# Patient Record
Sex: MALE | Race: BLACK OR AFRICAN AMERICAN | URBAN - METROPOLITAN AREA
[De-identification: names, ages, dates, MRNs, and addresses within clinical notes are randomized per-mention and may not be internally consistent; named-entity substitution may affect disease eponyms.]

---

## 2018-07-23 ENCOUNTER — OFFICE VISIT (OUTPATIENT)
Dept: INTERNAL MEDICINE CLINIC | Facility: CLINIC | Age: 48
End: 2018-07-23

## 2018-07-23 VITALS
DIASTOLIC BLOOD PRESSURE: 84 MMHG | HEIGHT: 72 IN | TEMPERATURE: 97 F | BODY MASS INDEX: 30.07 KG/M2 | HEART RATE: 72 BPM | WEIGHT: 222 LBS | RESPIRATION RATE: 18 BRPM | SYSTOLIC BLOOD PRESSURE: 119 MMHG

## 2018-07-23 DIAGNOSIS — E66.9 OBESITY, CLASS I, BMI 30-34.9: ICD-10-CM

## 2018-07-23 DIAGNOSIS — H57.89 REDNESS OF RIGHT EYE: ICD-10-CM

## 2018-07-23 DIAGNOSIS — K21.9 GASTROESOPHAGEAL REFLUX DISEASE WITHOUT ESOPHAGITIS: ICD-10-CM

## 2018-07-23 DIAGNOSIS — S16.1XXA STRAIN OF NECK MUSCLE, INITIAL ENCOUNTER: ICD-10-CM

## 2018-07-23 DIAGNOSIS — E55.9 VITAMIN D DEFICIENCY: ICD-10-CM

## 2018-07-23 DIAGNOSIS — M54.2 NECK PAIN ON RIGHT SIDE: Primary | ICD-10-CM

## 2018-07-23 RX ORDER — LORATADINE 10 MG/1
10 TABLET ORAL
Qty: 30 TAB | Refills: 2 | Status: SHIPPED | OUTPATIENT
Start: 2018-07-23 | End: 2018-08-06

## 2018-07-23 RX ORDER — CYCLOBENZAPRINE HCL 10 MG
TABLET ORAL
COMMUNITY
End: 2018-08-06

## 2018-07-23 RX ORDER — PANTOPRAZOLE SODIUM 20 MG/1
20 TABLET, DELAYED RELEASE ORAL DAILY
COMMUNITY
End: 2019-06-10 | Stop reason: SDUPTHER

## 2018-07-23 RX ORDER — GLUCOSAMINE SULFATE 1500 MG
POWDER IN PACKET (EA) ORAL DAILY
COMMUNITY
End: 2018-08-06

## 2018-07-23 RX ORDER — NAPROXEN 500 MG/1
500 TABLET ORAL 2 TIMES DAILY WITH MEALS
Qty: 30 TAB | Refills: 0 | Status: SHIPPED | OUTPATIENT
Start: 2018-07-23 | End: 2018-08-06

## 2018-07-23 RX ORDER — IBUPROFEN 800 MG/1
TABLET ORAL
COMMUNITY
End: 2018-07-23 | Stop reason: ALTCHOICE

## 2018-07-23 RX ORDER — TERBINAFINE HYDROCHLORIDE 250 MG/1
250 TABLET ORAL DAILY
COMMUNITY
End: 2019-05-07

## 2018-07-23 NOTE — PATIENT INSTRUCTIONS
Neck Strain or Sprain: Rehab Exercises Your Care Instructions Here are some examples of typical rehabilitation exercises for your condition. Start each exercise slowly. Ease off the exercise if you start to have pain. Your doctor or physical therapist will tell you when you can start these exercises and which ones will work best for you. How to do the exercises Neck rotation 1. Sit in a firm chair, or stand up straight. 2. Keeping your chin level, turn your head to the right, and hold for 15 to 30 seconds. 3. Turn your head to the left and hold for 15 to 30 seconds. 4. Repeat 2 to 4 times to each side. Neck stretches 1. Look straight ahead, and tip your right ear to your right shoulder. Do not let your left shoulder rise up as you tip your head to the right. 2. Hold for 15 to 30 seconds. 3. Tilt your head to the left. Do not let your right shoulder rise up as you tip your head to the left. 4. Hold for 15 to 30 seconds. 5. Repeat 2 to 4 times to each side. Forward neck flexion 1. Sit in a firm chair, or stand up straight. 2. Bend your head forward. 3. Hold for 15 to 30 seconds. 4. Repeat 2 to 4 times. Lateral (side) bend strengthening 1. With your right hand, place your first two fingers on your right temple. 2. Start to bend your head to the side while using gentle pressure from your fingers to keep your head from bending. 3. Hold for about 6 seconds. 4. Repeat 8 to 12 times. 5. Switch hands and repeat the same exercise on your left side. Forward bend strengthening 1. Place your first two fingers of either hand on your forehead. 2. Start to bend your head forward while using gentle pressure from your fingers to keep your head from bending. 3. Hold for about 6 seconds. 4. Repeat 8 to 12 times. Neutral position strengthening 1. Using one hand, place your fingertips on the back of your head at the top of your neck.  
2. Start to bend your head backward while using gentle pressure from your fingers to keep your head from bending. 3. Hold for about 6 seconds. 4. Repeat 8 to 12 times. Chin tuck 1. Lie on the floor with a rolled-up towel under your neck. Your head should be touching the floor. 2. Slowly bring your chin toward your chest. 
3. Hold for a count of 6, and then relax for up to 10 seconds. 4. Repeat 8 to 12 times. Follow-up care is a key part of your treatment and safety. Be sure to make and go to all appointments, and call your doctor if you are having problems. It's also a good idea to know your test results and keep a list of the medicines you take. Where can you learn more? Go to http://lyle-brandon.info/. Enter M679 in the search box to learn more about \"Neck Strain or Sprain: Rehab Exercises. \" Current as of: November 29, 2017 Content Version: 11.7 © 8599-2995 [employer]. Care instructions adapted under license by [employer] (which disclaims liability or warranty for this information). If you have questions about a medical condition or this instruction, always ask your healthcare professional. [address] any warranty or liability for your use of this information.

## 2018-07-23 NOTE — MR AVS SNAPSHOT
[address] [place] [address] 
[phone] Patient: Brionna Earl MRN: [id number] ICK:[id number] Visit Information Date & Time Provider Department Dept. Phone Encounter #  
 7/23/2018 11:00 AM Jo Doll NP [facility] Internal Medicine [phone] [id number] Follow-up Instructions Return for Schedule your annual physical with fasting labs. Upcoming Health Maintenance Date Due DTaP/Tdap/Td series (1 - Tdap) 3/6/1991 Influenza Age 5 to Adult 8/1/2018 Allergies as of 7/23/2018  Review Complete On: 7/23/2018 By: Jo Doll NP No Known Allergies Current Immunizations  Never Reviewed No immunizations on file. Not reviewed this visit You Were Diagnosed With   
  
 Codes Comments Neck pain on right side    -  Primary ICD-10-CM: M54.2 ICD-9-CM: 723.1 Redness of right eye     ICD-10-CM: H57.8 ICD-9-CM: 379.93 Vitamin D deficiency     ICD-10-CM: E55.9 ICD-9-CM: 268.9 Strain of neck muscle, initial encounter     ICD-10-CM: S16. Ulysess Quill ICD-9-CM: 847.0 Gastroesophageal reflux disease without esophagitis     ICD-10-CM: K21.9 ICD-9-CM: 530.81 Vitals BP Pulse Temp Resp Height(growth percentile) Weight(growth percentile) 119/84 72 97 °F (36.1 °C) (Oral) 18 6' (1.829 m) 222 lb (100.7 kg) BMI Smoking Status 30.11 kg/m2 Current Some Day Smoker Vitals History BMI and BSA Data Body Mass Index Body Surface Area  
 30.11 kg/m 2 2.26 m 2 Preferred Pharmacy Pharmacy Name Phone [facility] [address] [facility] [phone] Your Updated Medication List  
  
   
This list is accurate as of 7/23/18 11:49 AM.  Always use your most recent med list.  
  
  
  
  
 cyclobenzaprine 10 mg tablet Commonly known as:  FLEXERIL  
 Take  by mouth three (3) times daily as needed for Muscle Spasm(s). loratadine 10 mg tablet Commonly known as:  Brenda Nay Take 1 Tab by mouth nightly. naproxen 500 mg tablet Commonly known as:  NAPROSYN Take 1 Tab by mouth two (2) times daily (with meals). pantoprazole 20 mg tablet Commonly known as:  PROTONIX Take 20 mg by mouth daily. terbinafine HCl 250 mg tablet Commonly known as:  LAMISIL Take 250 mg by mouth daily. VITAMIN D3 1,000 unit Cap Generic drug:  cholecalciferol Take  by mouth daily. Prescriptions Sent to Pharmacy Refills  
 loratadine (CLARITIN) 10 mg tablet 2 Sig: Take 1 Tab by mouth nightly. Class: Normal  
 Pharmacy: [address] Ph #: [phone] Route: Oral  
 naproxen (NAPROSYN) 500 mg tablet 0 Sig: Take 1 Tab by mouth two (2) times daily (with meals). Class: Normal  
 Pharmacy: [address] Ph #: [phone] Route: Oral  
  
We Performed the Following REFERRAL TO SPORTS MEDICINE [EKS017 Custom] Follow-up Instructions Return for Schedule your annual physical with fasting labs. Referral Information Referral ID Referred By Referred To  
  
 [id number] SKIFF, Venancio Chute, MD   
   [address] [address] [address] Phone: [phone] Fax: [phone] Visits Status Start Date End Date 1 New Request 7/23/18 7/23/19 If your referral has a status of pending review or denied, additional information will be sent to support the outcome of this decision. Patient Instructions Neck Strain or Sprain: Rehab Exercises Your Care Instructions Here are some examples of typical rehabilitation exercises for your condition. Start each exercise slowly.  Ease off the exercise if you start to have pain. Your doctor or physical therapist will tell you when you can start these exercises and which ones will work best for you. How to do the exercises Neck rotation 1. Sit in a firm chair, or stand up straight. 2. Keeping your chin level, turn your head to the right, and hold for 15 to 30 seconds. 3. Turn your head to the left and hold for 15 to 30 seconds. 4. Repeat 2 to 4 times to each side. Neck stretches 1. Look straight ahead, and tip your right ear to your right shoulder. Do not let your left shoulder rise up as you tip your head to the right. 2. Hold for 15 to 30 seconds. 3. Tilt your head to the left. Do not let your right shoulder rise up as you tip your head to the left. 4. Hold for 15 to 30 seconds. 5. Repeat 2 to 4 times to each side. Forward neck flexion 1. Sit in a firm chair, or stand up straight. 2. Bend your head forward. 3. Hold for 15 to 30 seconds. 4. Repeat 2 to 4 times. Lateral (side) bend strengthening 1. With your right hand, place your first two fingers on your right temple. 2. Start to bend your head to the side while using gentle pressure from your fingers to keep your head from bending. 3. Hold for about 6 seconds. 4. Repeat 8 to 12 times. 5. Switch hands and repeat the same exercise on your left side. Forward bend strengthening 1. Place your first two fingers of either hand on your forehead. 2. Start to bend your head forward while using gentle pressure from your fingers to keep your head from bending. 3. Hold for about 6 seconds. 4. Repeat 8 to 12 times. Neutral position strengthening 1. Using one hand, place your fingertips on the back of your head at the top of your neck. 2. Start to bend your head backward while using gentle pressure from your fingers to keep your head from bending. 3. Hold for about 6 seconds. 4. Repeat 8 to 12 times. Chin tuck 1. Lie on the floor with a rolled-up towel under your neck.  Your head should be touching the floor. 2. Slowly bring your chin toward your chest. 
3. Hold for a count of 6, and then relax for up to 10 seconds. 4. Repeat 8 to 12 times. Follow-up care is a key part of your treatment and safety. Be sure to make and go to all appointments, and call your doctor if you are having problems. It's also a good idea to know your test results and keep a list of the medicines you take. Where can you learn more? Go to http://lyle-brandon.info/. Enter M679 in the search box to learn more about \"Neck Strain or Sprain: Rehab Exercises. \" Current as of: November 29, 2017 Content Version: 11.7 © 5671-5033 [facility] [employer]. Care instructions adapted under license by [employer] (which disclaims liability or warranty for this information). If you have questions about a medical condition or this instruction, always ask your healthcare professional. [address] any warranty or liability for your use of this information. Introducing [facility]! Sushant Mesa introduces [employer] patient portal. Now you can access parts of your medical record, email your doctor's office, and request medication refills online. 1. In your internet browser, go to [email or web address] 2. Click on the First Time User? Click Here link in the Sign In box. You will see the New Member Sign Up page. 3. Enter your [employer] Access Code exactly as it appears below. You will not need to use this code after youve completed the sign-up process. If you do not sign up before the expiration date, you must request a new code. · [employer] Access Code: [email or web address] Expires: 10/21/2018 11:11 AM 
 
4. Enter the last four digits of your Social Security Number (xxxx) and Date of Birth (mm/dd/yyyy) as indicated and click Submit. You will be taken to the next sign-up page. 5. Create a [employer] ID.  This will be your [employer] login ID and cannot be changed, so think of one that is secure and easy to remember. 6. Create a [employer] password. You can change your password at any time. 7. Enter your Password Reset Question and Answer. This can be used at a later time if you forget your password. 8. Enter your e-mail address. You will receive e-mail notification when new information is available in [address] 9. Click Sign Up. You can now view and download portions of your medical record. 10. Click the Download Summary menu link to download a portable copy of your medical information. If you have questions, please visit the Frequently Asked Questions section of the [employer] website. Remember, [employer] is NOT to be used for urgent needs. For medical emergencies, dial 911. Now available from your iPhone and Android! Please provide this summary of care documentation to your next provider. Your primary care clinician is listed as Sari Bauer. If you have any questions after today's visit, please call [phone].

## 2018-07-23 NOTE — PROGRESS NOTES
Subjective:      Trinity Plascencia is a 50 y.o. male who is a new patient and is here to establish care. Previous followed by PCP Cliff. The following sections were reviewed & updated as appropriate: PMH, PL, PSH, FH, RxH, and SH. Right eye: intermittent eye redness that started a few months ago. He denies itching, swelling, purulent drainage, pain. He wears reading glasses. When he is using reading glasses his eye will be runny. Overall vision is worsening but he denies any acute change during the redness, he denies floaters, curtain or shadows in his vision. He states he will wake up and it will be red, he will be at work and gets redness, symptoms are not consistent. 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 He noted warm sensation with pins and needles to his right upper back/neck. Pain rated 3-4/10, no pain right now. He has taken flexeril that did not help symptoms. He will only feel pain with certain movements. Symptoms started a couple of months ago. He noted one week when symptoms were gone. Patient Active Problem List    Diagnosis Date Noted    Vitamin D deficiency 07/23/2018     Current Outpatient Prescriptions   Medication Sig Dispense Refill    cholecalciferol (VITAMIN D3) 1,000 unit cap Take  by mouth daily.  ibuprofen (MOTRIN) 800 mg tablet Take  by mouth.  terbinafine HCl (LAMISIL) 250 mg tablet Take 250 mg by mouth daily.  cyclobenzaprine (FLEXERIL) 10 mg tablet Take  by mouth three (3) times daily as needed for Muscle Spasm(s).  pantoprazole (PROTONIX) 20 mg tablet Take 20 mg by mouth daily. Not on File  Past Medical History:   Diagnosis Date    Acid reflux         Review of Systems    A comprehensive review of systems was negative except for that written in the HPI.      Objective:     Visit Vitals    /84    Pulse 72    Temp 97 °F (36.1 °C) (Oral)    Resp 18    Ht 6' (1.829 m)    Wt 222 lb (100.7 kg)    BMI 30.11 kg/m2     General appearance: alert, cooperative, no distress, appears stated age  Head: Normocephalic, without obvious abnormality, atraumatic  Eyes: negative  Ears: normal TM's and external ear canals AU  Nose: Nares normal. Septum midline. Mucosa normal. No drainage or sinus tenderness. Throat: Lips, mucosa, and tongue normal. Teeth and gums normal  Neck: supple, symmetrical, trachea midline, no carotid bruit, no JVD. Trapezius tight on right, FROM. Right shoulder mobility normal.   Back: symmetric, no curvature. ROM normal. No CVA tenderness. Lungs: clear to auscultation bilaterally  Heart: regular rate and rhythm, S1, S2 normal, no murmur, click, rub or gallop  Extremities: extremities normal, atraumatic, no cyanosis or edema. Equal strength noted to upper arms  Pulses: 2+ and symmetric  Neurologic: Alert and oriented X 3, normal strength and tone. Normal symmetric reflexes. Normal coordination and gait  Psych: appropriate mood, speech, affect  Nursing note and vitals reviewed  Assessment/Plan:       ICD-10-CM ICD-9-CM    1. Neck pain on right side M54.2 723.1 REFERRAL TO SPORTS MEDICINE      naproxen (NAPROSYN) 500 mg tablet   2. Redness of right eye H57.8 379.93 loratadine (CLARITIN) 10 mg tablet   3. Vitamin D deficiency E55.9 268.9    4. Strain of neck muscle, initial encounter S16. 1XXA 847.0 REFERRAL TO SPORTS MEDICINE      naproxen (NAPROSYN) 500 mg tablet   5. Gastroesophageal reflux disease without esophagitis K21.9 530.81    6. Obesity, Class I, BMI 30-34.9 E66.9 278.00    He will follow up with Dr. Tom Carbone, advised heat and stretches, naproxen as needed. He will also start claritin to see if that improves eye redness, if not he will call office. Follow-up Disposition:  Return for Schedule your annual physical with fasting labs.          Advised him to call back or return to office if symptoms worsen/change/persist.  Discussed expected course/resolution/complications of diagnosis in detail with patient. Medication risks/benefits/costs/interactions/alternatives discussed with patient. He was given an after visit summary which includes diagnoses, current medications, & vitals. He expressed understanding with the diagnosis and plan.

## 2018-08-06 ENCOUNTER — OFFICE VISIT (OUTPATIENT)
Dept: INTERNAL MEDICINE CLINIC | Facility: CLINIC | Age: 48
End: 2018-08-06

## 2018-08-06 VITALS
HEIGHT: 72 IN | DIASTOLIC BLOOD PRESSURE: 70 MMHG | SYSTOLIC BLOOD PRESSURE: 115 MMHG | TEMPERATURE: 97.3 F | RESPIRATION RATE: 18 BRPM | BODY MASS INDEX: 29.53 KG/M2 | WEIGHT: 218 LBS | HEART RATE: 72 BPM

## 2018-08-06 DIAGNOSIS — Z11.3 SCREEN FOR STD (SEXUALLY TRANSMITTED DISEASE): ICD-10-CM

## 2018-08-06 DIAGNOSIS — Z12.11 COLON CANCER SCREENING: ICD-10-CM

## 2018-08-06 DIAGNOSIS — Z23 ENCOUNTER FOR IMMUNIZATION: ICD-10-CM

## 2018-08-06 DIAGNOSIS — E66.3 OVERWEIGHT (BMI 25.0-29.9): ICD-10-CM

## 2018-08-06 DIAGNOSIS — Z00.00 ENCOUNTER FOR GENERAL ADULT MEDICAL EXAMINATION W/O ABNORMAL FINDINGS: Primary | ICD-10-CM

## 2018-08-06 PROBLEM — E66.9 OBESITY, CLASS I, BMI 30-34.9: Status: RESOLVED | Noted: 2018-07-23 | Resolved: 2018-08-06

## 2018-08-06 NOTE — PROGRESS NOTES
Ternece Bradshaw  is a 50 y.o.  male  who present for TDAP immunizations/injections. He/she denies any symptoms , reactions or allergies that would exclude them from being immunized today. Risks and adverse reactions were discussed and the VIS was given if applicable to them. All questions were addressed. He/She was observed for 5 min post injection. There were no reactions observed.     Hugh Lamb, MICHELLE

## 2018-08-06 NOTE — MR AVS SNAPSHOT
[facility] 
 
 
 [facility] 
[phone] Patient: Keaton Witt MRN: [id number] Visit Information Date & Time Provider Department Dept. Phone Encounter #  
 8/6/2018  9:45 AM Rajesh Reyes [address] Internal Medicine [phone] [id number] Follow-up Instructions Return for As needed. Upcoming Health Maintenance Date Due Pneumococcal 19-64 Medium Risk (1 of 1 - PPSV23) 3/6/1989 DTaP/Tdap/Td series (1 - Tdap) 3/6/1991 Influenza Age 5 to Adult 8/1/2018 Allergies as of 8/6/2018  Review Complete On: 8/6/2018 By: Frantz Ramirez NP No Known Allergies Current Immunizations  Never Reviewed Name Date Tdap  Incomplete Not reviewed this visit You Were Diagnosed With   
  
 Codes Comments Encounter for general adult medical examination w/o abnormal findings    -  Primary ICD-10-CM: Z00.00 ICD-9-CM: V70.9 Overweight (BMI 25.0-29. 9)     ICD-10-CM: H75.5 ICD-9-CM: 278.02 Colon cancer screening     ICD-10-CM: Z12.11 ICD-9-CM: V76.51 Screen for STD (sexually transmitted disease)     ICD-10-CM: Z11.3 ICD-9-CM: V74.5 Encounter for immunization     ICD-10-CM: W24 ICD-9-CM: V03.89 Vitals BP Pulse Temp Resp Height(growth percentile) Weight(growth percentile) 115/70 72 97.3 °F (36.3 °C) (Oral) 18 6' (1.829 m) 218 lb (98.9 kg) BMI Smoking Status 29.57 kg/m2 Current Some Day Smoker Vitals History BMI and BSA Data Body Mass Index Body Surface Area  
 29.57 kg/m 2 2.24 m 2 Preferred Pharmacy Pharm[facility] Name Phone [place] [id number] [phone] Your Updated Medication List  
  
   
This list is accurate as of 8/6/18 10:17 AM.  Always use your most recent med list.  
  
  
  
  
 cyclobenzaprine 10 mg tablet Commonly known as:  FLEXERIL Take  by mouth three (3) times daily as needed for Muscle Spasm(s). loratadine 10 mg tablet Commonly known as:  Brenda Nay Take 1 Tab by mouth nightly. naproxen 500 mg tablet Commonly known as:  NAPROSYN Take 1 Tab by mouth two (2) times daily (with meals). pantoprazole 20 mg tablet Commonly known as:  PROTONIX Take 20 mg by mouth daily. terbinafine HCl 250 mg tablet Commonly known as:  LAMISIL Take 250 mg by mouth daily. VITAMIN D3 1,000 unit Cap Generic drug:  cholecalciferol Take  by mouth daily. We Performed the Following CBC WITH AUTOMATED DIFF [19779 CPT(R)] CT/NG/T.VAGINALIS AMPLIFICATION [id number] CPT(R)] HIV 1/2 AG/AB, 4TH GENERATION,W RFLX CONFIRM Q1933122 CPT(R)] LIPID PANEL [89032 CPT(R)] METABOLIC PANEL, COMPREHENSIVE [39077 CPT(R)] REFERRAL TO GASTROENTEROLOGY [XYW85 Custom] RPR W/REFLEX TITER AND TREPONEMA ABS [[id number] Custom] TETANUS, DIPHTHERIA TOXOIDS AND ACELLULAR PERTUSSIS VACCINE (TDAP), IN INDIVIDS. >=7, IM [id number] CPT(R)] Follow-up Instructions Return for As needed. Referral Information Referral ID Referred By Referred To  
  
 [id number] [facility], [address] [place] [address] [place] [address] 50 [facility] [address], [address] [place] [address] Visits Status Start Date End Date 1 New Request 8/6/18 8/6/19 If your referral has a status of pending review or denied, additional information will be sent to support the outcome of this decision. Patient Instructions Learning About Colonoscopy What is a colonoscopy? A colonoscopy is a test (also called a procedure) that lets a doctor look inside your large intestine. The doctor uses a thin, lighted tube called a colonoscope. The doctor uses it to look for small growths called polyps, colon or rectal cancer (colorectal cancer), or other problems like bleeding. During the procedure, the doctor can take samples of tissue. The samples can then be checked for cancer or other conditions. The doctor can also take out polyps. How is colonoscopy done? This procedure is done in a doctor's office or a clinic or hospital. You will get medicine to help you relax and not feel pain. Some people find that they do not remember having the test because of the medicine. The doctor gently moves the colonoscope, or scope, through the colon. The scope is also a small video camera. It lets the doctor see the colon and take pictures. A colonoscopy usually takes 30 to 45 minutes. It may take longer if the doctor has to remove polyps. How do you prepare for the procedure? You need to clean out your colon before the procedure so the doctor can see all of your colon. You may start the cleaning process a day or two before the test. This depends on which \"colon prep\" your doctor recommends. To clean your colon, you stop eating solid foods and drink only clear liquids. You can have water, tea, coffee, clear juices, clear broths, flavored ice pops, and gelatin (such as Jell-O). Do not drink anything red or purple, such as grape juice or fruit punch. And do not eat red or purple foods, such as grape ice pops or cherry gelatin. The day or night before the procedure, you drink a large amount of a special liquid. This causes loose, frequent stools. You will go to the bathroom a lot. It is very important to drink all of the colon prep liquid. If you have problems drinking the liquid, call your doctor. For many people, the prep is worse than the test. It may be uncomfortable, and you may feel hungry on the clear liquid diet. Some people do not go to work or do their usual activities on the day of the prep. Arrange to have someone take you home after the test. 
What can you expect after a colonoscopy? The nurses will watch you for 1 to 2 hours until the medicines wear off. Then you can go home. You will need a ride. Your doctor will tell you when you can eat and do your usual activities. Your doctor will talk to you about when you will need your next colonoscopy. The results of your test and your risk for colorectal cancer will help your doctor decide how often you need to be checked. Follow-up care is a key part of your treatment and safety. Be sure to make and go to all appointments, and call your doctor if you are having problems. It's also a good idea to know your test results and keep a list of the medicines you take. Where can you learn more? Go to http://lyle-brandon.info/. Enter D953 in the search box to learn more about \"Learning About Colonoscopy. \" Current as of: May 12, 2017 Content Version: 11.7 © 2277-1239 [employer], Incorporated. Care instructions adapted under license by [employer] (which disclaims liability or warranty for this information). If you have questions about a medical condition or this instruction, always ask your healthcare professional. [place] [address] any warranty or liability for your use of this information. Introducing [facility] & HEALTH SERVICES! [facility] introduces [email or web address] patient portal. Now you can access parts of your medical record, email your doctor's office, and request medication refills online. 1. In your internet browser, go to [email or web address] 2. Click on the First Time User? Click Here link in the Sign In box. You will see the New Member Sign Up page. 3. Enter your [email or web address] Access Code exactly as it appears below. You will not need to use this code after youve completed the sign-up process. If you do not sign up before the expiration date, you must request a new code. · [email or web address] Access Code: [email or web address] Expires: 10/21/2018 11:11 AM 
 
4.  Enter the last four digits of your Social Security Number (xxxx) and Date of Birth (mm/dd/yyyy) as indicated and click Submit. You will be taken to the next sign-up page. 5. Create a PeerJ ID. This will be your PeerJ login ID and cannot be changed, so think of one that is secure and easy to remember. 6. Create a PeerJ password. You can change your password at any time. 7. Enter your Password Reset Question and Answer. This can be used at a later time if you forget your password. 8. Enter your e-mail address. You will receive e-mail notification when new information is available in [address] 9. Click Sign Up. You can now view and download portions of your medical record. 10. Click the Download Summary menu link to download a portable copy of your medical information. If you have questions, please visit the Frequently Asked Questions section of the PeerJ website. Remember, PeerJ is NOT to be used for urgent needs. For medical emergencies, dial 911. Now available from your iPhone and Android! Please provide this summary of care documentation to your next provider. Your primary care clinician is listed as Digna Valle. If you have any questions after today's visit, please call [phone].

## 2018-08-06 NOTE — PROGRESS NOTES
Subjective:      Jef Koenig is a 50 y.o. male who presents today for CPE. He states his eye symptoms and neck pain have resolved. Health Maintenance  Immunizations:     Influenza: he will plan on getting it this fall. Tetanus: not UTD- will do today. Shingles: patient declined. Pneumonia: n/a. Cancer screening:     Prostate: deferred today, reviewed guidelines. Colon: guidelines reviewed, referral placed for colonoscopy. Body mass index is 29.57 kg/(m^2). 4lbs weight loss noted! Efforts encouraged! Wt Readings from Last 3 Encounters:   08/06/18 218 lb (98.9 kg)   07/23/18 222 lb (100.7 kg)     Patient Care Team:  Julieth Monreal NP as PCP - General (Nurse Practitioner)       The following sections were reviewed & updated as appropriate: PMH, PSH, FH, and SH. Patient Active Problem List   Diagnosis Code    Vitamin D deficiency E55.9    Gastroesophageal reflux disease without esophagitis K21.9    Neck pain on right side M54.2    Obesity, Class I, BMI 30-34.9 E66.9          Prior to Admission medications    Medication Sig Start Date End Date Taking? Authorizing Provider   cholecalciferol (VITAMIN D3) 1,000 unit cap Take  by mouth daily. Historical Provider   terbinafine HCl (LAMISIL) 250 mg tablet Take 250 mg by mouth daily. Historical Provider   cyclobenzaprine (FLEXERIL) 10 mg tablet Take  by mouth three (3) times daily as needed for Muscle Spasm(s). Historical Provider   pantoprazole (PROTONIX) 20 mg tablet Take 20 mg by mouth daily. Historical Provider   loratadine (CLARITIN) 10 mg tablet Take 1 Tab by mouth nightly. 7/23/18   Julieth Monreal NP   naproxen (NAPROSYN) 500 mg tablet Take 1 Tab by mouth two (2) times daily (with meals). 7/23/18   Julieth Monreal NP          No Known Allergies       Past Medical History:   Diagnosis Date    Acid reflux      No past surgical history on file.   Family History   Problem Relation Age of Onset    Diabetes Mother     No Known Problems Father     Diabetes Brother     Diabetes Maternal Grandmother     Hypertension Maternal Grandmother      Social History   Substance Use Topics    Smoking status: Current Some Day Smoker    Smokeless tobacco: Never Used    Alcohol use No        Review of Systems    A comprehensive review of systems was negative except for that written in the HPI. Objective:     Visit Vitals    /70    Pulse 72    Temp 97.3 °F (36.3 °C) (Oral)    Resp 18    Ht 6' (1.829 m)    Wt 218 lb (98.9 kg)    BMI 29.57 kg/m2     General:  Alert, cooperative, no distress, appears stated age. Head:  Normocephalic, without obvious abnormality, atraumatic. Eyes:  Conjunctivae/corneas clear. PERRL, EOMs intact. Fundi benign   Ears:  Normal TMs and external ear canals both ears. Nose: Nares normal. Septum midline. Mucosa normal. No drainage or sinus tenderness. Throat: Lips, mucosa, and tongue normal. Teeth and gums normal.   Neck: Supple, symmetrical, trachea midline, no adenopathy, thyroid: no enlargement/tenderness/nodules, no carotid bruit and no JVD. Back:   Symmetric, no curvature. ROM normal. No CVA tenderness. Lungs:   Clear to auscultation bilaterally. Chest wall:  No tenderness or deformity. Heart:  Regular rate and rhythm, S1, S2 normal, no murmur, click, rub or gallop. Abdomen:   Soft, non-tender. Bowel sounds normal. No masses,  No organomegaly. Extremities: Extremities normal, atraumatic, no cyanosis or edema. Pulses: 2+ and symmetric all extremities. Skin: Skin color, texture, turgor normal. No rashes or lesions   Lymph nodes: Cervical, supraclavicular, and axillary nodes normal.   Neurologic: CNII-XII intact. Normal strength, sensation and reflexes throughout. Nursing note and vitals reviewed  Assessment/Plan:       ICD-10-CM ICD-9-CM    1.  Encounter for general adult medical examination w/o abnormal findings Z00.00 V70.9 LIPID PANEL      CBC WITH AUTOMATED DIFF METABOLIC PANEL, COMPREHENSIVE   2. Overweight (BMI 25.0-29. 9) E66.3 278.02    3. Colon cancer screening Z12.11 V76.51 REFERRAL TO GASTROENTEROLOGY   4. Screen for STD (sexually transmitted disease) Z11.3 V74.5 HIV 1/2 AG/AB, 4TH GENERATION,W RFLX CONFIRM      RPR W/REFLEX TITER AND TREPONEMA ABS      CT/NG/T.VAGINALIS AMPLIFICATION   5. Encounter for immunization Z23 V03.89 TETANUS, DIPHTHERIA TOXOIDS AND ACELLULAR PERTUSSIS VACCINE (TDAP), IN INDIVIDS. >=7, IM     Follow-up Disposition:  Return for As needed. Advised him to call back or return to office if symptoms worsen/change/persist.  Discussed expected course/resolution/complications of diagnosis in detail with patient. Medication risks/benefits/costs/interactions/alternatives discussed with patient. He was given an after visit summary which includes diagnoses, current medications, & vitals. He expressed understanding with the diagnosis and plan.

## 2018-08-06 NOTE — PROGRESS NOTES
Chief Complaint   Patient presents with    Physical     1. Have you been to the ER, urgent care clinic since your last visit? Hospitalized since your last visit? No    2. Have you seen or consulted any other health care providers outside of the [address] [place] [address] since your last visit? Include any pap smears or colon screening.  No

## 2018-08-06 NOTE — PATIENT INSTRUCTIONS
Learning About Colonoscopy  What is a colonoscopy? A colonoscopy is a test (also called a procedure) that lets a doctor look inside your large intestine. The doctor uses a thin, lighted tube called a colonoscope. The doctor uses it to look for small growths called polyps, colon or rectal cancer (colorectal cancer), or other problems like bleeding. During the procedure, the doctor can take samples of tissue. The samples can then be checked for cancer or other conditions. The doctor can also take out polyps. How is colonoscopy done? This procedure is done in a doctor's office or a clinic or hospital. You will get medicine to help you relax and not feel pain. Some people find that they do not remember having the test because of the medicine. The doctor gently moves the colonoscope, or scope, through the colon. The scope is also a small video camera. It lets the doctor see the colon and take pictures. A colonoscopy usually takes 30 to 45 minutes. It may take longer if the doctor has to remove polyps. How do you prepare for the procedure? You need to clean out your colon before the procedure so the doctor can see all of your colon. You may start the cleaning process a day or two before the test. This depends on which \"colon prep\" your doctor recommends. To clean your colon, you stop eating solid foods and drink only clear liquids. You can have water, tea, coffee, clear juices, clear broths, flavored ice pops, and gelatin (such as Jell-O). Do not drink anything red or purple, such as grape juice or fruit punch. And do not eat red or purple foods, such as grape ice pops or cherry gelatin. The day or night before the procedure, you drink a large amount of a special liquid. This causes loose, frequent stools. You will go to the bathroom a lot. It is very important to drink all of the colon prep liquid. If you have problems drinking the liquid, call your doctor.   For many people, the prep is worse than the test. It may be uncomfortable, and you may feel hungry on the clear liquid diet. Some people do not go to work or do their usual activities on the day of the prep. Arrange to have someone take you home after the test.  What can you expect after a colonoscopy? The nurses will watch you for 1 to 2 hours until the medicines wear off. Then you can go home. You will need a ride. Your doctor will tell you when you can eat and do your usual activities. Your doctor will talk to you about when you will need your next colonoscopy. The results of your test and your risk for colorectal cancer will help your doctor decide how often you need to be checked. Follow-up care is a key part of your treatment and safety. Be sure to make and go to all appointments, and call your doctor if you are having problems. It's also a good idea to know your test results and keep a list of the medicines you take. Where can you learn more? Go to http://lyle-brandon.info/. Enter R290 in the search box to learn more about \"Learning About Colonoscopy. \"  Current as of: May 12, 2017  Content Version: 11.7  © 6478-4395 Zakada, Incorporated. Care instructions adapted under license by [employer] (which disclaims liability or warranty for this information). If you have questions about a medical condition or this instruction, always ask your healthcare professional. [address] any warranty or liability for your use of this information.

## 2018-08-08 LAB
ALBUMIN SERPL-MCNC: 4 G/DL (ref 3.5–5.5)
ALBUMIN/GLOB SERPL: 1.5 {RATIO} (ref 1.2–2.2)
ALP SERPL-CCNC: 68 IU/L (ref 39–117)
ALT SERPL-CCNC: 13 IU/L (ref 0–44)
AST SERPL-CCNC: 13 IU/L (ref 0–40)
BASOPHILS # BLD AUTO: 0 X10E3/UL (ref 0–0.2)
BASOPHILS NFR BLD AUTO: 0 %
BILIRUB SERPL-MCNC: 0.5 MG/DL (ref 0–1.2)
BUN SERPL-MCNC: 7 MG/DL (ref 6–24)
BUN/CREAT SERPL: 7 (ref 9–20)
C TRACH RRNA SPEC QL NAA+PROBE: NEGATIVE
CALCIUM SERPL-MCNC: 8.7 MG/DL (ref 8.7–10.2)
CHLORIDE SERPL-SCNC: 107 MMOL/L (ref 96–106)
CHOLEST SERPL-MCNC: 135 MG/DL (ref 100–199)
CO2 SERPL-SCNC: 25 MMOL/L (ref 20–29)
CREAT SERPL-MCNC: 0.95 MG/DL (ref 0.76–1.27)
EOSINOPHIL # BLD AUTO: 0.3 X10E3/UL (ref 0–0.4)
EOSINOPHIL NFR BLD AUTO: 5 %
ERYTHROCYTE [DISTWIDTH] IN BLOOD BY AUTOMATED COUNT: 13.8 % (ref 12.3–15.4)
GLOBULIN SER CALC-MCNC: 2.6 G/DL (ref 1.5–4.5)
GLUCOSE SERPL-MCNC: 89 MG/DL (ref 65–99)
HCT VFR BLD AUTO: 42.9 % (ref 37.5–51)
HDLC SERPL-MCNC: 51 MG/DL
HGB BLD-MCNC: 14 G/DL (ref 13–17.7)
HIV 1+2 AB+HIV1 P24 AG SERPL QL IA: NON REACTIVE
IMM GRANULOCYTES # BLD: 0 X10E3/UL (ref 0–0.1)
IMM GRANULOCYTES NFR BLD: 0 %
LDLC SERPL CALC-MCNC: 71 MG/DL (ref 0–99)
LYMPHOCYTES # BLD AUTO: 1.9 X10E3/UL (ref 0.7–3.1)
LYMPHOCYTES NFR BLD AUTO: 40 %
MCH RBC QN AUTO: 31.1 PG (ref 26.6–33)
MCHC RBC AUTO-ENTMCNC: 32.6 G/DL (ref 31.5–35.7)
MCV RBC AUTO: 95 FL (ref 79–97)
MONOCYTES # BLD AUTO: 0.4 X10E3/UL (ref 0.1–0.9)
MONOCYTES NFR BLD AUTO: 9 %
N GONORRHOEA RRNA SPEC QL NAA+PROBE: NEGATIVE
NEUTROPHILS # BLD AUTO: 2.1 X10E3/UL (ref 1.4–7)
NEUTROPHILS NFR BLD AUTO: 46 %
PLATELET # BLD AUTO: 250 X10E3/UL (ref 150–379)
POTASSIUM SERPL-SCNC: 4.1 MMOL/L (ref 3.5–5.2)
PROT SERPL-MCNC: 6.6 G/DL (ref 6–8.5)
RBC # BLD AUTO: 4.5 X10E6/UL (ref 4.14–5.8)
RPR SER QL: NON REACTIVE
SODIUM SERPL-SCNC: 144 MMOL/L (ref 134–144)
T VAGINALIS RRNA SPEC QL NAA+PROBE: NEGATIVE
TRIGL SERPL-MCNC: 67 MG/DL (ref 0–149)
VLDLC SERPL CALC-MCNC: 13 MG/DL (ref 5–40)
WBC # BLD AUTO: 4.7 X10E3/UL (ref 3.4–10.8)

## 2018-11-13 ENCOUNTER — OFFICE VISIT (OUTPATIENT)
Dept: INTERNAL MEDICINE CLINIC | Facility: CLINIC | Age: 48
End: 2018-11-13

## 2018-11-13 VITALS
TEMPERATURE: 98.7 F | BODY MASS INDEX: 29.26 KG/M2 | DIASTOLIC BLOOD PRESSURE: 88 MMHG | WEIGHT: 216 LBS | SYSTOLIC BLOOD PRESSURE: 122 MMHG | HEART RATE: 71 BPM | HEIGHT: 72 IN | RESPIRATION RATE: 16 BRPM

## 2018-11-13 DIAGNOSIS — M54.2 NECK PAIN: Primary | ICD-10-CM

## 2018-11-13 RX ORDER — NAPROXEN 500 MG/1
500 TABLET ORAL 2 TIMES DAILY WITH MEALS
Qty: 30 TAB | Refills: 0 | Status: SHIPPED | OUTPATIENT
Start: 2018-11-13 | End: 2019-05-07

## 2018-11-13 RX ORDER — HYDROCODONE BITARTRATE AND ACETAMINOPHEN 5; 325 MG/1; MG/1
TABLET ORAL
COMMUNITY
End: 2018-12-28

## 2018-11-13 NOTE — PATIENT INSTRUCTIONS
Neck Strain or Sprain: Rehab Exercises  Your Care Instructions  Here are some examples of typical rehabilitation exercises for your condition. Start each exercise slowly. Ease off the exercise if you start to have pain. Your doctor or physical therapist will tell you when you can start these exercises and which ones will work best for you. How to do the exercises  Neck rotation    1. Sit in a firm chair, or stand up straight. 2. Keeping your chin level, turn your head to the right, and hold for 15 to 30 seconds. 3. Turn your head to the left and hold for 15 to 30 seconds. 4. Repeat 2 to 4 times to each side. Neck stretches    1. Look straight ahead, and tip your right ear to your right shoulder. Do not let your left shoulder rise up as you tip your head to the right. 2. Hold for 15 to 30 seconds. 3. Tilt your head to the left. Do not let your right shoulder rise up as you tip your head to the left. 4. Hold for 15 to 30 seconds. 5. Repeat 2 to 4 times to each side. Forward neck flexion    1. Sit in a firm chair, or stand up straight. 2. Bend your head forward. 3. Hold for 15 to 30 seconds. 4. Repeat 2 to 4 times. Lateral (side) bend strengthening    1. With your right hand, place your first two fingers on your right temple. 2. Start to bend your head to the side while using gentle pressure from your fingers to keep your head from bending. 3. Hold for about 6 seconds. 4. Repeat 8 to 12 times. 5. Switch hands and repeat the same exercise on your left side. Forward bend strengthening    1. Place your first two fingers of either hand on your forehead. 2. Start to bend your head forward while using gentle pressure from your fingers to keep your head from bending. 3. Hold for about 6 seconds. 4. Repeat 8 to 12 times. Neutral position strengthening    1. Using one hand, place your fingertips on the back of your head at the top of your neck.   2. Start to bend your head backward while using gentle pressure from your fingers to keep your head from bending. 3. Hold for about 6 seconds. 4. Repeat 8 to 12 times. Chin tuck    1. Lie on the floor with a rolled-up towel under your neck. Your head should be touching the floor. 2. Slowly bring your chin toward your chest.  3. Hold for a count of 6, and then relax for up to 10 seconds. 4. Repeat 8 to 12 times. Follow-up care is a key part of your treatment and safety. Be sure to make and go to all appointments, and call your doctor if you are having problems. It's also a good idea to know your test results and keep a list of the medicines you take. Where can you learn more? Go to http://lyle-brandon.info/. Enter M679 in the search box to learn more about \"Neck Strain or Sprain: Rehab Exercises. \"  Current as of: November 29, 2017  Content Version: 11.8  © 1913-4654 Healthwise, Incorporated. Care instructions adapted under license by [employer] (which disclaims liability or warranty for this information). If you have questions about a medical condition or this instruction, always ask your healthcare professional. [address] any warranty or liability for your use of this information.

## 2018-11-13 NOTE — PROGRESS NOTES
Chief Complaint   Patient presents with    Neck Pain     1. Have you been to the ER, urgent care clinic since your last visit? Hospitalized since your last visit? No    2. Have you seen or consulted any other health care providers outside of the [address] [place] [address] since your last visit? Include any pap smears or colon screening.  No

## 2018-11-13 NOTE — PROGRESS NOTES
Subjective:      Randell Barone is a 50 y.o. male who presents today for neck pain. Neck Pain: symptoms started 6 months ago, they include throbbing in back of head and through right side of neck. He denies numbness and tingling. Pain is only 1-2 times a week. He has tried hydrocodone and states this helped, the muscle relaxer doesn't help. He notes pain mostly at night after his shifts. He was seen for this on 7/23/2018. Referral was placed at that time for him to follow up with Sports Medicine. BP Readings from Last 3 Encounters:   11/13/18 122/88   08/06/18 115/70   07/23/18 119/84     Patient Active Problem List    Diagnosis Date Noted    Vitamin D deficiency 07/23/2018    Gastroesophageal reflux disease without esophagitis 07/23/2018    Neck pain on right side 07/23/2018     Current Outpatient Medications   Medication Sig Dispense Refill    pantoprazole (PROTONIX) 20 mg tablet Take 20 mg by mouth daily.  HYDROcodone-acetaminophen (NORCO) 5-325 mg per tablet Take  by mouth.  terbinafine HCl (LAMISIL) 250 mg tablet Take 250 mg by mouth daily. No Known Allergies  Past Medical History:   Diagnosis Date    Acid reflux      History reviewed. No pertinent surgical history. Review of Systems    A comprehensive review of systems was negative except for that written in the HPI. Objective:     Visit Vitals  /88   Pulse 71   Temp 98.7 °F (37.1 °C) (Oral)   Resp 16   Ht 6' (1.829 m)   Wt 216 lb (98 kg)   BMI 29.29 kg/m²     General appearance: alert, cooperative, no distress, appears stated age  Head: Normocephalic, without obvious abnormality, atraumatic  Eyes: negative  Neck: supple, symmetrical, trachea midline, no adenopathy.  Head compression negative, right trapezius tight, FROM  Lungs: clear to auscultation bilaterally  Chest wall: no tenderness  Heart: regular rate and rhythm, S1, S2 normal, no murmur, click, rub or gallop  Extremities: extremities normal, atraumatic, no cyanosis or edema  Pulses: 2+ and symmetric  Skin: Skin color, texture, turgor normal. No rashes or lesions  Neurologic: Grossly normal  Nursing note and vitals reviewed  Assessment/Plan:       ICD-10-CM ICD-9-CM    1. Neck pain M54.2 723.1 naproxen (NAPROSYN) 500 mg tablet   He will follow up with Dr. Marilu Oates is symptoms persist despite NSAIDs, heat, streching    Follow-up Disposition:  Return for Schedule your annual physical with fasting labs. Advised him to call back or return to office if symptoms worsen/change/persist.  Discussed expected course/resolution/complications of diagnosis in detail with patient. Medication risks/benefits/costs/interactions/alternatives discussed with patient. He was given an after visit summary which includes diagnoses, current medications, & vitals. He expressed understanding with the diagnosis and plan.

## 2018-12-28 ENCOUNTER — OFFICE VISIT (OUTPATIENT)
Dept: INTERNAL MEDICINE CLINIC | Facility: CLINIC | Age: 48
End: 2018-12-28

## 2018-12-28 VITALS
HEART RATE: 82 BPM | BODY MASS INDEX: 29.26 KG/M2 | WEIGHT: 216 LBS | HEIGHT: 72 IN | RESPIRATION RATE: 16 BRPM | TEMPERATURE: 98.5 F | DIASTOLIC BLOOD PRESSURE: 79 MMHG | SYSTOLIC BLOOD PRESSURE: 117 MMHG

## 2018-12-28 DIAGNOSIS — R05.9 COUGH: Primary | ICD-10-CM

## 2018-12-28 DIAGNOSIS — J01.00 ACUTE MAXILLARY SINUSITIS, RECURRENCE NOT SPECIFIED: ICD-10-CM

## 2018-12-28 RX ORDER — AZITHROMYCIN 250 MG/1
TABLET, FILM COATED ORAL
Qty: 6 TAB | Refills: 0 | Status: SHIPPED | OUTPATIENT
Start: 2018-12-28 | End: 2019-01-02

## 2018-12-28 RX ORDER — CODEINE PHOSPHATE AND GUAIFENESIN 10; 100 MG/5ML; MG/5ML
5 SOLUTION ORAL
Qty: 118 ML | Refills: 0 | Status: SHIPPED | OUTPATIENT
Start: 2018-12-28 | End: 2019-05-07

## 2018-12-28 NOTE — PROGRESS NOTES
Subjective:      Soco Stevens is a 50 y.o. male who presents today for   Chief Complaint   Patient presents with    Sinus Pain     cough, cold, congestion. patient said symptoms started last week, taking nyquil and dayquil  Started out with cold symptoms, positive body aches, no fever chills or diarrhea  More recently a lot of nasal congestion, green colored, facial pressure      Patient Active Problem List    Diagnosis Date Noted    Vitamin D deficiency 07/23/2018    Gastroesophageal reflux disease without esophagitis 07/23/2018    Neck pain on right side 07/23/2018     Current Outpatient Medications   Medication Sig Dispense Refill    naproxen (NAPROSYN) 500 mg tablet Take 1 Tab by mouth two (2) times daily (with meals). 30 Tab 0    terbinafine HCl (LAMISIL) 250 mg tablet Take 250 mg by mouth daily.  pantoprazole (PROTONIX) 20 mg tablet Take 20 mg by mouth daily.  HYDROcodone-acetaminophen (NORCO) 5-325 mg per tablet Take  by mouth. No Known Allergies  Past Medical History:   Diagnosis Date    Acid reflux      No past surgical history on file. Family History   Problem Relation Age of Onset    Diabetes Mother     No Known Problems Father     Diabetes Brother     Diabetes Maternal Grandmother     Hypertension Maternal Grandmother      Social History     Tobacco Use    Smoking status: Current Some Day Smoker    Smokeless tobacco: Never Used   Substance Use Topics    Alcohol use: No        Review of Systems    A comprehensive review of systems was negative except for that written in the HPI. Objective:     Visit Vitals  /79   Pulse 82   Temp 98.5 °F (36.9 °C) (Oral)   Resp 16   Ht 6' (1.829 m)   Wt 216 lb (98 kg)   BMI 29.29 kg/m²     General:  Alert, cooperative, no distress, appears stated age. Head:  Normocephalic, without obvious abnormality, atraumatic. Eyes:  Conjunctivae/corneas clear. PERRL, EOMs intact. Fundi benign.    Ears:  Normal TMs and external ear canals both ears. Nose: Nares normal. Septum midline. Mucosa normal. positive drainage and positive sinus tenderness. Throat: Lips, mucosa, and tongue normal. Teeth and gums normal.   Neck: Supple, symmetrical, trachea midline, no adenopathy, thyroid: no enlargement/tenderness/nodules, no carotid bruit and no JVD. Back:   Symmetric, no curvature. ROM normal. No CVA tenderness. Lungs:   Clear to auscultation bilaterally. Chest wall:  No tenderness or deformity. Heart:  Regular rate and rhythm, S1, S2 normal, no murmur, click, rub or gallop. Assessment/Plan:       ICD-10-CM ICD-9-CM    1. Cough R05 786.2 guaiFENesin-codeine (GUAIFENESIN AC) 100-10 mg/5 mL solution   2. Acute maxillary sinusitis, recurrence not specified J01.00 461.0 guaiFENesin-codeine (GUAIFENESIN AC) 100-10 mg/5 mL solution  Warned about sedation      ZPACK       Follow-up Disposition: Not on File   Advised him to call back or return to office if symptoms worsen/change/persist.  Discussed expected course/resolution/complications of diagnosis in detail with patient. Medication risks/benefits/costs/interactions/alternatives discussed with patient. He was given an after visit summary which includes diagnoses, current medications, & vitals. He expressed understanding with the diagnosis and plan.

## 2018-12-28 NOTE — PROGRESS NOTES
Chief Complaint   Patient presents with    Sinus Pain     cough, cold, congestion. 1. Have you been to the ER, urgent care clinic since your last visit? Hospitalized since your last visit? No    2. Have you seen or consulted any other health care providers outside of the [address] [place] [address] since your last visit? Include any pap smears or colon screening.  No

## 2018-12-28 NOTE — LETTER
NOTIFICATION OF RETURN TO WORK / SCHOOL 
12/28/2018 Mr. Thakur 
[address] [facility] [id number] To Whom It May Concern: 
 
Ofe was under the care of [facility]. Please excuse from work 12/26/18 through 12/28/18 due to illness. If there are questions or concerns please have the patient contact our office. Sincerely, Nayely Lopez MD

## 2019-04-22 ENCOUNTER — OFFICE VISIT (OUTPATIENT)
Dept: INTERNAL MEDICINE CLINIC | Facility: CLINIC | Age: 49
End: 2019-04-22

## 2019-04-22 VITALS
SYSTOLIC BLOOD PRESSURE: 114 MMHG | DIASTOLIC BLOOD PRESSURE: 79 MMHG | WEIGHT: 212 LBS | OXYGEN SATURATION: 98 % | RESPIRATION RATE: 16 BRPM | TEMPERATURE: 98 F | HEART RATE: 76 BPM | BODY MASS INDEX: 28.75 KG/M2

## 2019-04-22 DIAGNOSIS — R07.89 CHEST WALL PAIN: ICD-10-CM

## 2019-04-22 DIAGNOSIS — J30.1 ALLERGIC RHINITIS DUE TO POLLEN, UNSPECIFIED SEASONALITY: ICD-10-CM

## 2019-04-22 DIAGNOSIS — N52.9 ERECTILE DYSFUNCTION, UNSPECIFIED ERECTILE DYSFUNCTION TYPE: Primary | ICD-10-CM

## 2019-04-22 RX ORDER — CYCLOBENZAPRINE HCL 5 MG
5 TABLET ORAL
Qty: 30 TAB | Refills: 0 | Status: SHIPPED | OUTPATIENT
Start: 2019-04-22 | End: 2019-06-10 | Stop reason: SDUPTHER

## 2019-04-22 RX ORDER — OLOPATADINE HYDROCHLORIDE 1 MG/ML
2 SOLUTION/ DROPS OPHTHALMIC 2 TIMES DAILY
Qty: 5 ML | Refills: 1 | Status: SHIPPED | OUTPATIENT
Start: 2019-04-22

## 2019-04-22 NOTE — PROGRESS NOTES
Identified pt with two pt identifiers(name and ). Reviewed record in preparation for visit and have obtained necessary documentation. Chief Complaint Patient presents with  Allergies  
  chest congestion and red eyes for over a week Health Maintenance Due Topic  Pneumococcal 0-64 years (1 of 1 - PPSV23) Coordination of Care Questionnaire: 
:  
1) Have you been to an emergency room, urgent care, or hospitalized since your last visit? If yes, where when, and reason for visit? no  
 
 
2. Have seen or consulted any other health care provider since your last visit? If yes, where when, and reason for visit? NO 
 
 
3) Do you have an Advanced Directive/ Living Will in place? NO If yes, do we have a copy on file NO If no, would you like information NO Patient is accompanied by self I have received verbal consent from Zachery Tellez to discuss any/all medical information while they are present in the room. Visit Vitals /79 (BP 1 Location: Left arm, BP Patient Position: Sitting) Pulse 76 Temp 98 °F (36.7 °C) Resp 16 Ht (P) 6' (1.829 m) Wt 212 lb (96.2 kg) SpO2 98% BMI (P) 28.75 kg/m² Srini Confucianist, LPN

## 2019-04-22 NOTE — PROGRESS NOTES
Subjective:  
  
Alea Fair is a 52 y.o. male who presents today for Chief Complaint Patient presents with  Allergies  
  chest congestion and red eyes for over a week  
eyes itchy red and watery. No eye pain. He does have a history of allergies and asthma. He is taking loratadine daily. He is taking flonase daily. He has a constant chest disocomfort. It changes with movement. He does push carts at work. He can relieve the pain with certain movements. He does not cough, he is not sob. He has taken ibuprofen Patient Active Problem List  
 Diagnosis Date Noted  Vitamin D deficiency 07/23/2018  Gastroesophageal reflux disease without esophagitis 07/23/2018  Neck pain on right side 07/23/2018 Current Outpatient Medications Medication Sig Dispense Refill  pantoprazole (PROTONIX) 20 mg tablet Take 20 mg by mouth daily.  guaiFENesin-codeine (GUAIFENESIN AC) 100-10 mg/5 mL solution Take 5 mL by mouth three (3) times daily as needed for Cough. Max Daily Amount: 15 mL. 118 mL 0  
 naproxen (NAPROSYN) 500 mg tablet Take 1 Tab by mouth two (2) times daily (with meals). 30 Tab 0  
 terbinafine HCl (LAMISIL) 250 mg tablet Take 250 mg by mouth daily. No Known Allergies Past Medical History:  
Diagnosis Date  Acid reflux History reviewed. No pertinent surgical history. Family History Problem Relation Age of Onset  Diabetes Mother  No Known Problems Father  Diabetes Brother  Diabetes Maternal Grandmother  Hypertension Maternal Grandmother Social History Tobacco Use  Smoking status: Current Some Day Smoker  Smokeless tobacco: Never Used Substance Use Topics  Alcohol use: No  
  
 
Review of Systems A comprehensive review of systems was negative except for that written in the HPI. Objective:  
 
Visit Vitals /79 (BP 1 Location: Left arm, BP Patient Position: Sitting) Pulse 76 Temp 98 °F (36.7 °C) Resp 16  
 Ht (P) 6' (1.829 m) Wt 212 lb (96.2 kg) SpO2 98% BMI (P) 28.75 kg/m² General:  Alert, cooperative, no distress, appears stated age. Head:  Normocephalic, without obvious abnormality, atraumatic. Eyes:  Sclera mildly injected. PERRL, EOMs intact. Ears:  Normal TMs and external ear canals both ears. Nose: Nares normal. Septum midline. Mucosa normal. No drainage or sinus tenderness. Throat: Lips, mucosa, and tongue normal. Teeth and gums normal.  
Neck: Supple, symmetrical, trachea midline, no adenopathy, thyroid: no enlargement/tenderness/nodules, no carotid bruit and no JVD. Back:   Symmetric, no curvature. ROM normal. No CVA tenderness. Lungs:   Clear to auscultation bilaterally. Chest wall:  No tenderness or deformity. Heart:  Regular rate and rhythm, S1, S2 normal, no murmur, click, rub or gallop. Extremities: Extremities normal, atraumatic, no cyanosis or edema. Pulses: 2+ and symmetric all extremities. Skin: Skin color, texture, turgor normal. No rashes or lesions. Lymph nodes: Cervical, supraclavicular, and axillary nodes normal.  
Neurologic: CNII-XII intact. Normal strength, sensation and reflexes throughout. Assessment/Plan: ICD-10-CM ICD-9-CM 1. Erectile dysfunction, unspecified erectile dysfunction type N52.9 607.84 TESTOSTERONE, FREE & TOTAL REFERRAL TO UROLOGY 2. Chest wall pain R07.89 786.52 cyclobenzaprine (FLEXERIL) 5 mg tablet 3. Allergic rhinitis due to pollen, unspecified seasonality J30.1 477.0 patanol ophthalmic solution Advised him to call back or return to office if symptoms worsen/change/persist. 
Discussed expected course/resolution/complications of diagnosis in detail with patient. Medication risks/benefits/costs/interactions/alternatives discussed with patient. He was given an after visit summary which includes diagnoses, current medications, & vitals. He expressed understanding with the diagnosis and plan.

## 2019-06-10 ENCOUNTER — OFFICE VISIT (OUTPATIENT)
Dept: INTERNAL MEDICINE CLINIC | Facility: CLINIC | Age: 49
End: 2019-06-10

## 2019-06-10 VITALS
SYSTOLIC BLOOD PRESSURE: 117 MMHG | HEART RATE: 73 BPM | WEIGHT: 210 LBS | BODY MASS INDEX: 28.44 KG/M2 | HEIGHT: 72 IN | TEMPERATURE: 98.7 F | RESPIRATION RATE: 16 BRPM | DIASTOLIC BLOOD PRESSURE: 77 MMHG

## 2019-06-10 DIAGNOSIS — K21.9 GASTROESOPHAGEAL REFLUX DISEASE WITHOUT ESOPHAGITIS: ICD-10-CM

## 2019-06-10 DIAGNOSIS — M54.9 MID BACK PAIN ON LEFT SIDE: Primary | ICD-10-CM

## 2019-06-10 DIAGNOSIS — M54.2 NECK PAIN: ICD-10-CM

## 2019-06-10 RX ORDER — NAPROXEN 500 MG/1
500 TABLET ORAL 2 TIMES DAILY WITH MEALS
Qty: 30 TAB | Refills: 0 | Status: SHIPPED | OUTPATIENT
Start: 2019-06-10

## 2019-06-10 RX ORDER — PANTOPRAZOLE SODIUM 20 MG/1
20 TABLET, DELAYED RELEASE ORAL DAILY
Qty: 30 TAB | Refills: 1 | Status: SHIPPED | OUTPATIENT
Start: 2019-06-10

## 2019-06-10 RX ORDER — CYCLOBENZAPRINE HCL 5 MG
5 TABLET ORAL
Qty: 30 TAB | Refills: 1 | Status: SHIPPED | OUTPATIENT
Start: 2019-06-10

## 2019-06-10 NOTE — PATIENT INSTRUCTIONS

## 2019-06-10 NOTE — LETTER
NOTIFICATION RETURN TO WORK 
 
6/10/2019 8:45 AM 
 
Mr. Libby Leal 
[address] [facility] [id number] To Whom It May Concern: 
 
Libby Leal is currently under the care of [facility]. He will return to work today. Please excuse him from work on Friday as he was ill. If there are questions or concerns please have the patient contact our office.  
 
 
 
Sincerely, 
 
 
Wilma Hooper NP

## 2019-06-10 NOTE — PROGRESS NOTES
Chief Complaint   Patient presents with    Back Pain     back pain, right shoulder and neck pain. Asking for ibuprofen to be refilled as well. 1. Have you been to the ER, urgent care clinic since your last visit? Hospitalized since your last visit? No    2. Have you seen or consulted any other health care providers outside of the [address] [place] [address] since your last visit? Include any pap smears or colon screening.  No

## 2019-06-10 NOTE — PROGRESS NOTES
Subjective:      Zachery Tellez is a 52 y.o. male who presents today for back pain. Back pain: symptoms started yesterday, they include spasms to right shoulder, neck, and mid back pain. He denies numbness and tingling, swelling, falls, MVC. He has tried flexeril in th past and this helps. He notes slow improvement. This is the same symptoms as when he saw me 7/23/18, he was referred to Dr. Tereza Martin but has not seen him. Patient Active Problem List    Diagnosis Date Noted    Vitamin D deficiency 07/23/2018    Gastroesophageal reflux disease without esophagitis 07/23/2018    Neck pain on right side 07/23/2018     Current Outpatient Medications   Medication Sig Dispense Refill    cyclobenzaprine (FLEXERIL) 5 mg tablet Take 1 Tab by mouth three (3) times daily as needed for Muscle Spasm(s). 30 Tab 0    pantoprazole (PROTONIX) 20 mg tablet Take 20 mg by mouth daily.  olopatadine (PATANOL) 0.1 % ophthalmic solution Administer 2 Drops to both eyes two (2) times a day. 5 mL 1     No Known Allergies  Past Medical History:   Diagnosis Date    Acid reflux      History reviewed. No pertinent surgical history. Review of Systems    A comprehensive review of systems was negative except for that written in the HPI. Objective:     Visit Vitals  /77   Pulse 73   Temp 98.7 °F (37.1 °C) (Oral)   Resp 16   Ht 6' (1.829 m)   Wt 210 lb (95.3 kg)   BMI 28.48 kg/m²     General appearance: alert, cooperative, no distress, appears stated age  Head: Normocephalic, without obvious abnormality, atraumatic  Eyes: negative  Ears: normal TM's and external ear canals AU  Neck: supple, symmetrical, trachea midline, no adenopathy, thyroid: not enlarged, symmetric, no tenderness/mass/nodules, no carotid bruit and no JVD  Back: symmetric, no curvature. ROM normal. No CVA tenderness.  Pain with palpation to left mid back  Lungs: clear to auscultation bilaterally  Chest wall: no tenderness  Heart: regular rate and rhythm, S1, S2 normal, no murmur, click, rub or gallop  Extremities: extremities normal, atraumatic, no cyanosis or edema  Pulses: 2+ and symmetric  Neurologic: Alert and oriented X 3, normal strength and tone. Normal coordination and gait  Nursing note and vitals reviewed  Assessment/Plan:       ICD-10-CM ICD-9-CM    1. Gastroesophageal reflux disease without esophagitis K21.9 530.81 pantoprazole (PROTONIX) 20 mg tablet   2. Chest wall pain R07.89 786.52 cyclobenzaprine (FLEXERIL) 5 mg tablet   3. Neck pain M54.2 723.1 naproxen (NAPROSYN) 500 mg tablet   He was advised to schedule follow up with Dr. Andrew Duvall and Dispositions    · Return if symptoms worsen or fail to improve. Advised him to call back or return to office if symptoms worsen/change/persist.  Discussed expected course/resolution/complications of diagnosis in detail with patient. Medication risks/benefits/costs/interactions/alternatives discussed with patient. He was given an after visit summary which includes diagnoses, current medications, & vitals. He expressed understanding with the diagnosis and plan.